# Patient Record
Sex: MALE | Race: ASIAN | NOT HISPANIC OR LATINO
[De-identification: names, ages, dates, MRNs, and addresses within clinical notes are randomized per-mention and may not be internally consistent; named-entity substitution may affect disease eponyms.]

---

## 2017-02-06 PROBLEM — Z00.00 ENCOUNTER FOR PREVENTIVE HEALTH EXAMINATION: Status: ACTIVE | Noted: 2017-02-06

## 2017-04-12 ENCOUNTER — APPOINTMENT (OUTPATIENT)
Dept: INTERNAL MEDICINE | Facility: CLINIC | Age: 32
End: 2017-04-12

## 2018-02-23 ENCOUNTER — OUTPATIENT (OUTPATIENT)
Dept: OUTPATIENT SERVICES | Facility: HOSPITAL | Age: 33
LOS: 1 days | End: 2018-02-23
Payer: COMMERCIAL

## 2018-02-23 ENCOUNTER — APPOINTMENT (OUTPATIENT)
Dept: MRI IMAGING | Facility: CLINIC | Age: 33
End: 2018-02-23
Payer: COMMERCIAL

## 2018-02-23 DIAGNOSIS — R42 DIZZINESS AND GIDDINESS: ICD-10-CM

## 2018-02-23 PROCEDURE — 82565 ASSAY OF CREATININE: CPT

## 2018-02-23 PROCEDURE — 70553 MRI BRAIN STEM W/O & W/DYE: CPT | Mod: 26

## 2018-02-23 PROCEDURE — 70553 MRI BRAIN STEM W/O & W/DYE: CPT

## 2018-02-23 PROCEDURE — A9585: CPT

## 2018-04-12 ENCOUNTER — APPOINTMENT (OUTPATIENT)
Dept: NEUROLOGY | Facility: CLINIC | Age: 33
End: 2018-04-12
Payer: COMMERCIAL

## 2018-04-12 VITALS — SYSTOLIC BLOOD PRESSURE: 140 MMHG | DIASTOLIC BLOOD PRESSURE: 80 MMHG

## 2018-04-12 VITALS
DIASTOLIC BLOOD PRESSURE: 86 MMHG | HEART RATE: 89 BPM | SYSTOLIC BLOOD PRESSURE: 129 MMHG | WEIGHT: 180 LBS | HEIGHT: 68 IN | BODY MASS INDEX: 27.28 KG/M2

## 2018-04-12 DIAGNOSIS — I10 ESSENTIAL (PRIMARY) HYPERTENSION: ICD-10-CM

## 2018-04-12 DIAGNOSIS — F17.200 NICOTINE DEPENDENCE, UNSPECIFIED, UNCOMPLICATED: ICD-10-CM

## 2018-04-12 DIAGNOSIS — Z78.9 OTHER SPECIFIED HEALTH STATUS: ICD-10-CM

## 2018-04-12 PROCEDURE — 99204 OFFICE O/P NEW MOD 45 MIN: CPT

## 2018-09-04 ENCOUNTER — APPOINTMENT (OUTPATIENT)
Dept: OPHTHALMOLOGY | Facility: CLINIC | Age: 33
End: 2018-09-04
Payer: COMMERCIAL

## 2018-09-04 ENCOUNTER — TRANSCRIPTION ENCOUNTER (OUTPATIENT)
Age: 33
End: 2018-09-04

## 2018-09-04 DIAGNOSIS — H10.12 ACUTE ATOPIC CONJUNCTIVITIS, LEFT EYE: ICD-10-CM

## 2018-09-04 PROCEDURE — 92004 COMPRE OPH EXAM NEW PT 1/>: CPT

## 2018-09-25 ENCOUNTER — APPOINTMENT (OUTPATIENT)
Dept: OPHTHALMOLOGY | Facility: CLINIC | Age: 33
End: 2018-09-25
Payer: COMMERCIAL

## 2018-09-25 PROCEDURE — 92012 INTRM OPH EXAM EST PATIENT: CPT

## 2018-09-25 PROCEDURE — 92250 FUNDUS PHOTOGRAPHY W/I&R: CPT

## 2018-10-25 ENCOUNTER — APPOINTMENT (OUTPATIENT)
Dept: OPHTHALMOLOGY | Facility: CLINIC | Age: 33
End: 2018-10-25
Payer: COMMERCIAL

## 2018-10-25 PROCEDURE — 76512 OPH US DX B-SCAN: CPT

## 2018-10-25 PROCEDURE — 76514 ECHO EXAM OF EYE THICKNESS: CPT

## 2018-10-25 PROCEDURE — 92012 INTRM OPH EXAM EST PATIENT: CPT

## 2018-10-25 PROCEDURE — 92133 CPTRZD OPH DX IMG PST SGM ON: CPT

## 2018-12-13 ENCOUNTER — APPOINTMENT (OUTPATIENT)
Dept: OPHTHALMOLOGY | Facility: CLINIC | Age: 33
End: 2018-12-13
Payer: COMMERCIAL

## 2018-12-13 PROCEDURE — 92083 EXTENDED VISUAL FIELD XM: CPT

## 2018-12-13 PROCEDURE — ZZZZZ: CPT

## 2018-12-13 PROCEDURE — 92012 INTRM OPH EXAM EST PATIENT: CPT

## 2019-04-06 ENCOUNTER — APPOINTMENT (OUTPATIENT)
Dept: RADIOLOGY | Facility: CLINIC | Age: 34
End: 2019-04-06
Payer: COMMERCIAL

## 2019-04-06 ENCOUNTER — OUTPATIENT (OUTPATIENT)
Dept: OUTPATIENT SERVICES | Facility: HOSPITAL | Age: 34
LOS: 1 days | End: 2019-04-06
Payer: COMMERCIAL

## 2019-04-06 DIAGNOSIS — Z00.8 ENCOUNTER FOR OTHER GENERAL EXAMINATION: ICD-10-CM

## 2019-04-06 PROCEDURE — 71046 X-RAY EXAM CHEST 2 VIEWS: CPT

## 2019-04-06 PROCEDURE — 71046 X-RAY EXAM CHEST 2 VIEWS: CPT | Mod: 26

## 2019-05-13 ENCOUNTER — APPOINTMENT (OUTPATIENT)
Dept: ELECTROPHYSIOLOGY | Facility: CLINIC | Age: 34
End: 2019-05-13

## 2019-09-19 DIAGNOSIS — Z31.440 ENCOUNTER OF MALE FOR TESTING FOR GENETIC DISEASE CARRIER STATUS FOR PROCREATIVE MANAGEMENT: ICD-10-CM

## 2019-10-01 LAB — GENE DIS ANL CARRIER INTERP-IMP: NEGATIVE

## 2019-12-05 ENCOUNTER — APPOINTMENT (OUTPATIENT)
Dept: OPHTHALMOLOGY | Facility: CLINIC | Age: 34
End: 2019-12-05
Payer: COMMERCIAL

## 2019-12-05 ENCOUNTER — NON-APPOINTMENT (OUTPATIENT)
Age: 34
End: 2019-12-05

## 2019-12-05 PROCEDURE — 92014 COMPRE OPH EXAM EST PT 1/>: CPT

## 2019-12-05 PROCEDURE — 92133 CPTRZD OPH DX IMG PST SGM ON: CPT

## 2020-01-07 ENCOUNTER — NON-APPOINTMENT (OUTPATIENT)
Age: 35
End: 2020-01-07

## 2020-01-07 ENCOUNTER — APPOINTMENT (OUTPATIENT)
Dept: OPHTHALMOLOGY | Facility: CLINIC | Age: 35
End: 2020-01-07
Payer: COMMERCIAL

## 2020-01-07 PROCEDURE — 92083 EXTENDED VISUAL FIELD XM: CPT

## 2020-01-07 PROCEDURE — 92012 INTRM OPH EXAM EST PATIENT: CPT

## 2020-03-17 ENCOUNTER — APPOINTMENT (OUTPATIENT)
Dept: OPHTHALMOLOGY | Facility: CLINIC | Age: 35
End: 2020-03-17

## 2020-05-12 ENCOUNTER — APPOINTMENT (OUTPATIENT)
Dept: OPHTHALMOLOGY | Facility: CLINIC | Age: 35
End: 2020-05-12

## 2022-06-13 ENCOUNTER — NON-APPOINTMENT (OUTPATIENT)
Age: 37
End: 2022-06-13

## 2022-06-13 ENCOUNTER — TRANSCRIPTION ENCOUNTER (OUTPATIENT)
Age: 37
End: 2022-06-13

## 2022-06-13 ENCOUNTER — APPOINTMENT (OUTPATIENT)
Dept: OPHTHALMOLOGY | Facility: CLINIC | Age: 37
End: 2022-06-13
Payer: COMMERCIAL

## 2022-06-13 PROCEDURE — 92083 EXTENDED VISUAL FIELD XM: CPT

## 2022-06-13 PROCEDURE — 92133 CPTRZD OPH DX IMG PST SGM ON: CPT

## 2022-06-13 PROCEDURE — 99214 OFFICE O/P EST MOD 30 MIN: CPT

## 2022-12-01 ENCOUNTER — APPOINTMENT (OUTPATIENT)
Dept: CARDIOLOGY | Facility: CLINIC | Age: 37
End: 2022-12-01

## 2022-12-01 ENCOUNTER — NON-APPOINTMENT (OUTPATIENT)
Age: 37
End: 2022-12-01

## 2022-12-01 VITALS
HEART RATE: 92 BPM | DIASTOLIC BLOOD PRESSURE: 110 MMHG | OXYGEN SATURATION: 100 % | BODY MASS INDEX: 26.67 KG/M2 | SYSTOLIC BLOOD PRESSURE: 180 MMHG | WEIGHT: 176 LBS | HEIGHT: 68 IN

## 2022-12-01 DIAGNOSIS — I10 ESSENTIAL (PRIMARY) HYPERTENSION: ICD-10-CM

## 2022-12-01 PROCEDURE — 99205 OFFICE O/P NEW HI 60 MIN: CPT

## 2022-12-01 PROCEDURE — 93000 ELECTROCARDIOGRAM COMPLETE: CPT

## 2022-12-01 RX ORDER — LOSARTAN POTASSIUM 100 MG/1
TABLET, FILM COATED ORAL
Refills: 0 | Status: DISCONTINUED | COMMUNITY
End: 2022-12-01

## 2022-12-01 NOTE — HISTORY OF PRESENT ILLNESS
[FreeTextEntry1] : 37 year old man with history of atrial fibrillation, HTN, pre-DM *A1c 5.9) coming in to establish care after following up elsewhere previously. States he has no compliants but just waned to reconnect with care. \par Stopped eating meat, lost 20 pounds.\par Cut down smoking- 1x/month. Before 3 cigarettes a day. \par Exercises everyday, has a a bike. \par \par Has no complaints, no chest pain, SOB. States he has white coat hypertension. \par BP 12/8014/90\par Reduced salt in cooking\par States he has been told he has irregular heart beat but nothing else. \par \par FH\par M and F- HTN\par No heart attack \par \par Medications\par Nifedipine ER 60 mg\par Fenofibrate 145 mcg - PCP stopping this because of the diet thing. PCP Dr. Eduardo\par \par LDL 80, TAG 50, HDL 51, Cholesterol 141

## 2022-12-01 NOTE — DISCUSSION/SUMMARY
[FreeTextEntry1] : 37 year old man with HTN, HLD, atrial fibrillation who presents today for cardiac care. \par \par HTN\par Extremely uncontrolled with repeat in office at 180/140. Respite reluctant to medications being added on, explained to patient he is at high risk for stroke, and especially so with his uncontrolled BP. as well as atrial fibrillation\par -continue nifedipine 60 mg daily \par -losartan 25 mg started today\par \par HLD\par Unclear why on fenofibrate solo therapy. Possibly was told he had high TAG in the past.TAG 50, LDL 80\par -Stop fenofibrate, recheck lipids in 3 months. If elevated TAG or LDL, plan for statin given first line\par \par Atrial fibrillation\par LIkely longer standing as patient knew of some history of it but no cardioversion or any rhythem control strategy in the past. \par -plan for TTE to define anatomy, LA size, durability of DCCV\par -patient to be set up with Dr. Stuart for DCCV as first line to see if sinus rhythem can be restored.\par -Chadsvasc 1 but reluctant to start AC in the setting of such elevated BP. Once better controlled, will start DOAC especially prior to possible DCCV\par \par Differential diagnosis and plan of care discussed with patient after the evaluation. \par Counseling on diet, exercise, and medication compliance was done.\par Counseling on smoking and alcohol cessation was offered when appropriate.\par Pain assessed and judicious use of narcotics when appropriate was discussed.\par Importance of fall prevention discussed.\par Advanced care planning was discussed with patient and family.\par   [EKG obtained to assist in diagnosis and management of assessed problem(s)] : EKG obtained to assist in diagnosis and management of assessed problem(s)

## 2022-12-06 ENCOUNTER — APPOINTMENT (OUTPATIENT)
Dept: ELECTROPHYSIOLOGY | Facility: CLINIC | Age: 37
End: 2022-12-06

## 2022-12-06 ENCOUNTER — NON-APPOINTMENT (OUTPATIENT)
Age: 37
End: 2022-12-06

## 2022-12-06 VITALS — SYSTOLIC BLOOD PRESSURE: 137 MMHG | DIASTOLIC BLOOD PRESSURE: 93 MMHG

## 2022-12-06 VITALS — SYSTOLIC BLOOD PRESSURE: 171 MMHG | OXYGEN SATURATION: 99 % | HEART RATE: 82 BPM | DIASTOLIC BLOOD PRESSURE: 101 MMHG

## 2022-12-06 DIAGNOSIS — R42 DIZZINESS AND GIDDINESS: ICD-10-CM

## 2022-12-06 DIAGNOSIS — I49.9 CARDIAC ARRHYTHMIA, UNSPECIFIED: ICD-10-CM

## 2022-12-06 PROCEDURE — 99204 OFFICE O/P NEW MOD 45 MIN: CPT

## 2022-12-06 PROCEDURE — 93000 ELECTROCARDIOGRAM COMPLETE: CPT

## 2022-12-06 RX ORDER — UBIDECARENONE/VIT E ACET 100MG-5
CAPSULE ORAL
Refills: 0 | Status: DISCONTINUED | COMMUNITY
End: 2022-12-06

## 2022-12-06 RX ORDER — FEBUXOSTAT 80 MG/1
80 TABLET ORAL
Refills: 0 | Status: DISCONTINUED | COMMUNITY
End: 2022-12-06

## 2022-12-06 RX ORDER — METOPROLOL TARTRATE 25 MG/1
25 TABLET, FILM COATED ORAL DAILY
Refills: 0 | Status: DISCONTINUED | COMMUNITY
End: 2022-12-06

## 2022-12-06 RX ORDER — LOTEPREDNOL ETABONATE AND TOBRAMYCIN 5; 3 MG/ML; MG/ML
0.5-0.3 SUSPENSION/ DROPS OPHTHALMIC
Qty: 1 | Refills: 0 | Status: DISCONTINUED | COMMUNITY
Start: 2018-09-04 | End: 2022-12-06

## 2022-12-06 RX ORDER — AMLODIPINE BESYLATE 2.5 MG/1
2.5 TABLET ORAL DAILY
Refills: 0 | Status: DISCONTINUED | COMMUNITY
End: 2022-12-06

## 2022-12-06 RX ORDER — FENOFIBRATE 145 MG/1
145 TABLET, COATED ORAL DAILY
Refills: 0 | Status: DISCONTINUED | COMMUNITY
End: 2022-12-06

## 2022-12-12 PROBLEM — R42 LIGHTHEADEDNESS: Status: ACTIVE | Noted: 2018-04-12

## 2022-12-12 PROBLEM — I49.9 CARDIAC ARRHYTHMIA: Status: ACTIVE | Noted: 2018-04-12

## 2022-12-12 NOTE — END OF VISIT
[FreeTextEntry3] : Seen and examined with ACP.  I agree with H and P, A and P.\par Frequent atrial ectopy and periods of PAT with Wenckebach.  MAY benefit from ablation, however I suggested that we try to assess with a repeat monitor.  [Time Spent: ___ minutes] : I have spent [unfilled] minutes of time on the encounter.

## 2022-12-12 NOTE — DISCUSSION/SUMMARY
[FreeTextEntry1] : Mr. Goldberg is a 37 year old male with long standing history of dizziness. His EKG today revealed Atrial rhythm with AV wenckebach, which is likely the cause of his dizziness. His echocardiogram revealed normal heart function. His previous monitoring revealed frequent APCs, however, unable to assess the burden. We discussed the medical therapy is limited as it may further worsen his symptoms. I suggested getting 3 days event monitoring (Zio) to assess APC burden and the rhythm pertaining to his symptoms.  We will revisit the option for therapy pending results.

## 2022-12-12 NOTE — HISTORY OF PRESENT ILLNESS
[FreeTextEntry1] : Mr. Goldberg is a 37 year old male presenting today for initial consultation for his dizziness. His past medical history includes hypertension, hyperlipidemia and dizziness. He was followed up by Dr. Prather for several years but recently established care with Dr. Mcnulty. He was noted with uncontrolled hypertension and was recently started on losartan. He also has a history of frequent APCs and recent EKG was suspicious for Afib and presents today for further evaluation. \par \par The patient reports that he has had dizziness for many years and in 2018 had seen a neurologist, however brain MRI didn’t reveal anything significant. He has had echo with Dr. Prather, which was unremarkable. He also wore an event monitor that revealed frequent APCs, c/w his history but no significant findings. He is anticipating carotid Doppler with his PCP soon. \par \par The patient report that his he gets episodes of dizziness every once a week, usually no triggers and occasionally experiences transient presyncopal feeling. The episodes have occurred mostly while driving but recently he had the episode while walking. No chest pain, SOB, 10 years of HTN. Initially his dizziness was presumed to be related to many hypertensive medications that he was taking and he has been taken off of most of the medications. He has felt a little better but the dizziness still occurs intermittently. He has made some lifestyle modifications (cut back on meat and carb intake) as well and has lost some weight since June, 2022. He hydrates adequately, smokes socially, drinks socially and minimal caffeine intake. Denies any drugs. No chest pain, SOB, palpitations or syncope.

## 2022-12-12 NOTE — PHYSICAL EXAM
[Well Developed] : well developed [Well Nourished] : well nourished [No Acute Distress] : no acute distress [Normal Conjunctiva] : normal conjunctiva [Normal Venous Pressure] : normal venous pressure [No Carotid Bruit] : no carotid bruit [Normal S1, S2] : normal S1, S2 [No Murmur] : no murmur [No Rub] : no rub [No Gallop] : no gallop [Clear Lung Fields] : clear lung fields [Good Air Entry] : good air entry [No Respiratory Distress] : no respiratory distress  [Soft] : abdomen soft [Non Tender] : non-tender [No Masses/organomegaly] : no masses/organomegaly [Normal Bowel Sounds] : normal bowel sounds [Normal Gait] : normal gait [No Edema] : no edema [No Cyanosis] : no cyanosis [No Clubbing] : no clubbing [No Varicosities] : no varicosities [No Rash] : no rash [No Skin Lesions] : no skin lesions [Moves all extremities] : moves all extremities [No Focal Deficits] : no focal deficits [Normal Speech] : normal speech [Alert and Oriented] : alert and oriented [Normal memory] : normal memory [de-identified] : EKG - atrial rhythm with AV Wenkebach

## 2022-12-12 NOTE — CARDIOLOGY SUMMARY
[de-identified] : Today - Atrial rhythm with AV Wenkebach [de-identified] : 04/13/2021:\par Normal LV size and systolic function with EF - 74%\par Trace aortic regurgitation\par Normal LA

## 2022-12-12 NOTE — REVIEW OF SYSTEMS
[Dizziness] : dizziness [Negative] : Heme/Lymph [FreeTextEntry5] : see HPI [de-identified] : see HPI

## 2023-01-13 ENCOUNTER — NON-APPOINTMENT (OUTPATIENT)
Age: 38
End: 2023-01-13

## 2023-01-13 ENCOUNTER — APPOINTMENT (OUTPATIENT)
Dept: CARDIOLOGY | Facility: CLINIC | Age: 38
End: 2023-01-13
Payer: COMMERCIAL

## 2023-01-13 VITALS
SYSTOLIC BLOOD PRESSURE: 164 MMHG | BODY MASS INDEX: 26.46 KG/M2 | WEIGHT: 174 LBS | OXYGEN SATURATION: 98 % | HEART RATE: 82 BPM | DIASTOLIC BLOOD PRESSURE: 71 MMHG

## 2023-01-13 PROCEDURE — 93000 ELECTROCARDIOGRAM COMPLETE: CPT

## 2023-01-13 PROCEDURE — 99215 OFFICE O/P EST HI 40 MIN: CPT

## 2023-01-13 NOTE — DISCUSSION/SUMMARY
[FreeTextEntry1] : 37 year old man with HTN, HLD, atrial fibrillation who presents today for cardiac care. \par \par HTN\par home BP reviewed, with normal pressues, with white coat HTN. Respite reluctant to medications being added on, explained to patient he is at high risk for stroke, and especially so with his uncontrolled BP. as well as atrial fibrillation\par -continue nifedipine 60 mg daily \par -losartan 25 mg started today\par \par HLD\par Unclear why on fenofibrate solo therapy. Possibly was told he had high TAG in the past.TAG 50, LDL 80\par -Stop fenofibrate, recheck lipids in 3 months. If elevated TAG or LDL, plan for statin given first line\par \par AV Wenchebach\par PAC burden 25% with AV Wenckebach. Symptomatic. \par -plan for TTE to define anatomy, LA size, durability of DCCV, read pending with normal LV LA size on my read\par -possible plan for atrial ablation\par \par Differential diagnosis and plan of care discussed with patient after the evaluation. \par Counseling on diet, exercise, and medication compliance was done.\par Counseling on smoking and alcohol cessation was offered when appropriate.\par Pain assessed and judicious use of narcotics when appropriate was discussed.\par Importance of fall prevention discussed.\par Advanced care planning was discussed with patient and family.\par

## 2023-01-13 NOTE — HISTORY OF PRESENT ILLNESS
[FreeTextEntry1] : 37 year old man with history of AV wenchebach, high PAC burden, HTN, pre-DM *A1c 5.9) coming in to establish care after following up elsewhere previously. Last time saw atrial arrythmia which he saw EP for and it is AV wenchebach with 25% PAC. States he has no complaints but just waned to reconnect with care. \par Stopped eating meat, lost 20 pounds.\par Cut down smoking- 1x/month. Before 3 cigarettes a day. Now completely 0. \par Exercises everyday, has a a bike. \par \par Has no complaints, no chest pain, SOB. States he has white coat hypertension- reviewed all his BP at home which are in normal range. \par BP 12/8014/90\par Reduced salt in cooking\par States he has been told he has irregular heart beat but nothing else. \par \par FH\par M and F- HTN\par No heart attack \par \par Medications\par Nifedipine ER 60 mg\par Fenofibrate 145 mcg - PCP stopping this because of the diet thing. PCP Dr. Eduardo\par \par LDL 80, TAG 50, HDL 51, Cholesterol 141

## 2023-02-09 ENCOUNTER — OUTPATIENT (OUTPATIENT)
Dept: OUTPATIENT SERVICES | Facility: HOSPITAL | Age: 38
LOS: 1 days | End: 2023-02-09
Payer: COMMERCIAL

## 2023-02-09 VITALS
TEMPERATURE: 98 F | DIASTOLIC BLOOD PRESSURE: 89 MMHG | HEIGHT: 68 IN | SYSTOLIC BLOOD PRESSURE: 158 MMHG | HEART RATE: 81 BPM | WEIGHT: 175.93 LBS | RESPIRATION RATE: 16 BRPM | OXYGEN SATURATION: 98 %

## 2023-02-09 DIAGNOSIS — I49.1 ATRIAL PREMATURE DEPOLARIZATION: ICD-10-CM

## 2023-02-09 DIAGNOSIS — Z01.818 ENCOUNTER FOR OTHER PREPROCEDURAL EXAMINATION: ICD-10-CM

## 2023-02-09 LAB
ANION GAP SERPL CALC-SCNC: 10 MMOL/L — SIGNIFICANT CHANGE UP (ref 5–17)
BLD GP AB SCN SERPL QL: NEGATIVE — SIGNIFICANT CHANGE UP
BUN SERPL-MCNC: 22 MG/DL — SIGNIFICANT CHANGE UP (ref 7–23)
CALCIUM SERPL-MCNC: 9.9 MG/DL — SIGNIFICANT CHANGE UP (ref 8.4–10.5)
CHLORIDE SERPL-SCNC: 104 MMOL/L — SIGNIFICANT CHANGE UP (ref 96–108)
CO2 SERPL-SCNC: 29 MMOL/L — SIGNIFICANT CHANGE UP (ref 22–31)
CREAT SERPL-MCNC: 0.76 MG/DL — SIGNIFICANT CHANGE UP (ref 0.5–1.3)
EGFR: 119 ML/MIN/1.73M2 — SIGNIFICANT CHANGE UP
GLUCOSE SERPL-MCNC: 88 MG/DL — SIGNIFICANT CHANGE UP (ref 70–99)
HCT VFR BLD CALC: 45.1 % — SIGNIFICANT CHANGE UP (ref 39–50)
HGB BLD-MCNC: 14.9 G/DL — SIGNIFICANT CHANGE UP (ref 13–17)
MCHC RBC-ENTMCNC: 29 PG — SIGNIFICANT CHANGE UP (ref 27–34)
MCHC RBC-ENTMCNC: 33 GM/DL — SIGNIFICANT CHANGE UP (ref 32–36)
MCV RBC AUTO: 87.7 FL — SIGNIFICANT CHANGE UP (ref 80–100)
NRBC # BLD: 0 /100 WBCS — SIGNIFICANT CHANGE UP (ref 0–0)
PLATELET # BLD AUTO: 243 K/UL — SIGNIFICANT CHANGE UP (ref 150–400)
POTASSIUM SERPL-MCNC: 4.2 MMOL/L — SIGNIFICANT CHANGE UP (ref 3.5–5.3)
POTASSIUM SERPL-SCNC: 4.2 MMOL/L — SIGNIFICANT CHANGE UP (ref 3.5–5.3)
RBC # BLD: 5.14 M/UL — SIGNIFICANT CHANGE UP (ref 4.2–5.8)
RBC # FLD: 12.9 % — SIGNIFICANT CHANGE UP (ref 10.3–14.5)
RH IG SCN BLD-IMP: POSITIVE — SIGNIFICANT CHANGE UP
SODIUM SERPL-SCNC: 143 MMOL/L — SIGNIFICANT CHANGE UP (ref 135–145)
WBC # BLD: 9.68 K/UL — SIGNIFICANT CHANGE UP (ref 3.8–10.5)
WBC # FLD AUTO: 9.68 K/UL — SIGNIFICANT CHANGE UP (ref 3.8–10.5)

## 2023-02-09 PROCEDURE — G0463: CPT

## 2023-02-09 PROCEDURE — 85027 COMPLETE CBC AUTOMATED: CPT

## 2023-02-09 PROCEDURE — 80048 BASIC METABOLIC PNL TOTAL CA: CPT

## 2023-02-09 PROCEDURE — 86850 RBC ANTIBODY SCREEN: CPT

## 2023-02-09 PROCEDURE — 86900 BLOOD TYPING SEROLOGIC ABO: CPT

## 2023-02-09 PROCEDURE — 86901 BLOOD TYPING SEROLOGIC RH(D): CPT

## 2023-02-09 RX ORDER — LOSARTAN POTASSIUM 100 MG/1
0 TABLET, FILM COATED ORAL
Qty: 0 | Refills: 0 | DISCHARGE

## 2023-02-09 RX ORDER — NIFEDIPINE 30 MG
0 TABLET, EXTENDED RELEASE 24 HR ORAL
Qty: 0 | Refills: 0 | DISCHARGE

## 2023-02-09 NOTE — H&P PST ADULT - HISTORY OF PRESENT ILLNESS
37 year old male with PMH HTN, Covid 7/2022 mild, atrial premature depolarization possible atrial fibrillation planned for afib ablation on 3/6/2023    ***covid test 3/3 @ [facility]   denies any recent covid infection or exposure  37 year old male with PMH HTN, Covid 7/2022 mild, lightheadedness x 10 years/atrial premature depolarization possible atrial fibrillation planned for afib ablation on 3/6/2023    ***covid test 3/3 @ [facility]   denies any recent covid infection or exposure

## 2023-02-09 NOTE — H&P PST ADULT - NSICDXPASTMEDICALHX_GEN_ALL_CORE_FT
PAST MEDICAL HISTORY:  Atrial premature depolarization     H/O dizziness     Hypertension      PAST MEDICAL HISTORY:  2019 novel coronavirus disease (COVID-19)     Atrial premature depolarization     Dry eyes     H/O dizziness     Hypertension

## 2023-02-09 NOTE — H&P PST ADULT - PROBLEM SELECTOR PLAN 1
afib ablation  PST labs send  preprocedure instructions discussed  continue antihypertensive meds at night

## 2023-03-01 ENCOUNTER — NON-APPOINTMENT (OUTPATIENT)
Age: 38
End: 2023-03-01

## 2023-03-03 ENCOUNTER — OUTPATIENT (OUTPATIENT)
Dept: OUTPATIENT SERVICES | Facility: HOSPITAL | Age: 38
LOS: 1 days | End: 2023-03-03
Payer: COMMERCIAL

## 2023-03-03 DIAGNOSIS — Z11.52 ENCOUNTER FOR SCREENING FOR COVID-19: ICD-10-CM

## 2023-03-03 PROBLEM — I49.1 ATRIAL PREMATURE DEPOLARIZATION: Chronic | Status: ACTIVE | Noted: 2023-02-09

## 2023-03-03 PROBLEM — I10 ESSENTIAL (PRIMARY) HYPERTENSION: Chronic | Status: ACTIVE | Noted: 2023-02-09

## 2023-03-03 PROBLEM — H04.123 DRY EYE SYNDROME OF BILATERAL LACRIMAL GLANDS: Chronic | Status: ACTIVE | Noted: 2023-02-09

## 2023-03-03 PROBLEM — Z87.898 PERSONAL HISTORY OF OTHER SPECIFIED CONDITIONS: Chronic | Status: ACTIVE | Noted: 2023-02-09

## 2023-03-03 PROBLEM — U07.1 COVID-19: Chronic | Status: ACTIVE | Noted: 2023-02-09

## 2023-03-03 PROCEDURE — U0003: CPT

## 2023-03-03 PROCEDURE — C9803: CPT

## 2023-03-03 PROCEDURE — U0005: CPT

## 2023-03-04 LAB — SARS-COV-2 RNA SPEC QL NAA+PROBE: SIGNIFICANT CHANGE UP

## 2023-03-06 ENCOUNTER — INPATIENT (INPATIENT)
Facility: HOSPITAL | Age: 38
LOS: 0 days | Discharge: HOME | DRG: 274 | End: 2023-03-06
Attending: INTERNAL MEDICINE | Admitting: INTERNAL MEDICINE
Payer: COMMERCIAL

## 2023-03-06 ENCOUNTER — TRANSCRIPTION ENCOUNTER (OUTPATIENT)
Age: 38
End: 2023-03-06

## 2023-03-06 VITALS
WEIGHT: 171.96 LBS | HEIGHT: 68 IN | OXYGEN SATURATION: 98 % | DIASTOLIC BLOOD PRESSURE: 86 MMHG | HEART RATE: 77 BPM | RESPIRATION RATE: 98 BRPM | TEMPERATURE: 99 F | SYSTOLIC BLOOD PRESSURE: 137 MMHG

## 2023-03-06 VITALS
RESPIRATION RATE: 17 BRPM | SYSTOLIC BLOOD PRESSURE: 134 MMHG | TEMPERATURE: 99 F | DIASTOLIC BLOOD PRESSURE: 95 MMHG | OXYGEN SATURATION: 98 % | HEART RATE: 65 BPM

## 2023-03-06 DIAGNOSIS — I49.1 ATRIAL PREMATURE DEPOLARIZATION: ICD-10-CM

## 2023-03-06 LAB
BLD GP AB SCN SERPL QL: NEGATIVE — SIGNIFICANT CHANGE UP
RH IG SCN BLD-IMP: POSITIVE — SIGNIFICANT CHANGE UP

## 2023-03-06 PROCEDURE — 93010 ELECTROCARDIOGRAM REPORT: CPT | Mod: 77

## 2023-03-06 PROCEDURE — 93653 COMPRE EP EVAL TX SVT: CPT

## 2023-03-06 PROCEDURE — 93623 PRGRMD STIMJ&PACG IV RX NFS: CPT | Mod: 26

## 2023-03-06 PROCEDURE — 86850 RBC ANTIBODY SCREEN: CPT

## 2023-03-06 PROCEDURE — 93010 ELECTROCARDIOGRAM REPORT: CPT

## 2023-03-06 PROCEDURE — 93005 ELECTROCARDIOGRAM TRACING: CPT

## 2023-03-06 PROCEDURE — 86901 BLOOD TYPING SEROLOGIC RH(D): CPT

## 2023-03-06 PROCEDURE — 86900 BLOOD TYPING SEROLOGIC ABO: CPT

## 2023-03-06 PROCEDURE — 93623 PRGRMD STIMJ&PACG IV RX NFS: CPT

## 2023-03-06 NOTE — ASU DISCHARGE PLAN (ADULT/PEDIATRIC) - CARE PROVIDER_API CALL
John Carter)  Cardiac Electrophysiology; Cardiology  [address] [place] [address]  Phone: [phone]  Fax: [phone]  Scheduled Appointment: 04/18/2023 02:30 PM

## 2023-03-06 NOTE — CHART NOTE - NSCHARTNOTEFT_GEN_A_CORE
PRE ABLATION NOTE    H and P dated 2/9/23  Medications reconciled  Allergies reviewed  PE unchanged.    Pt presents for afib ablation.     Micah Choudhary, NP

## 2023-03-06 NOTE — ASU DISCHARGE PLAN (ADULT/PEDIATRIC) - HAVE YOU HAD COVID IN THE LAST 60 DAYS?
y  Post -op Follow-up / Discharge Note / Instructions        Analgesics:      Tylenol #3 / Motrin  Antibiotics:     na  Dressings:      Nbandaids   Diet:      Regular                 Follow-up:      2 weeks     Discharge Instructions / Activity:      Pelvic rest   No

## 2023-03-06 NOTE — PATIENT PROFILE ADULT - FALL HARM RISK - UNIVERSAL INTERVENTIONS
Bed in lowest position, wheels locked, appropriate side rails in place/Call bell, personal items and telephone in reach/Instruct patient to call for assistance before getting out of bed or chair/Non-slip footwear when patient is out of bed/[place] to call system/Physically safe environment - no spills, clutter or unnecessary equipment/Purposeful Proactive Rounding/Room/bathroom lighting operational, light cord in reach

## 2023-03-17 ENCOUNTER — NON-APPOINTMENT (OUTPATIENT)
Age: 38
End: 2023-03-17

## 2023-04-11 ENCOUNTER — NON-APPOINTMENT (OUTPATIENT)
Age: 38
End: 2023-04-11

## 2023-04-11 ENCOUNTER — APPOINTMENT (OUTPATIENT)
Dept: OTOLARYNGOLOGY | Facility: CLINIC | Age: 38
End: 2023-04-11
Payer: COMMERCIAL

## 2023-04-11 VITALS
SYSTOLIC BLOOD PRESSURE: 156 MMHG | TEMPERATURE: 97.9 F | BODY MASS INDEX: 26.52 KG/M2 | WEIGHT: 175 LBS | HEIGHT: 68 IN | HEART RATE: 86 BPM | DIASTOLIC BLOOD PRESSURE: 97 MMHG

## 2023-04-11 DIAGNOSIS — R26.89 OTHER ABNORMALITIES OF GAIT AND MOBILITY: ICD-10-CM

## 2023-04-11 DIAGNOSIS — J34.2 DEVIATED NASAL SEPTUM: ICD-10-CM

## 2023-04-11 DIAGNOSIS — H93.13 TINNITUS, BILATERAL: ICD-10-CM

## 2023-04-11 PROCEDURE — 92567 TYMPANOMETRY: CPT

## 2023-04-11 PROCEDURE — 31231 NASAL ENDOSCOPY DX: CPT

## 2023-04-11 PROCEDURE — 92557 COMPREHENSIVE HEARING TEST: CPT

## 2023-04-11 PROCEDURE — 99204 OFFICE O/P NEW MOD 45 MIN: CPT | Mod: 25

## 2023-04-11 NOTE — HISTORY OF PRESENT ILLNESS
[de-identified] : Patient has had about 5 years of bilateral ringing in the ears. He states that its daily, but does not keep him awake at night \par He then had an ablation last month and the ringing is worse after the anesthesia for that. \par He has not had any recent hearing tests \par he occasionally feels unbalanced since the ringing worsened.

## 2023-04-11 NOTE — ASSESSMENT
[FreeTextEntry1] : Patient long history of tinnitus has not been worked up in the past recently worsened after he had an a anesthesia for an ablation for arrhythmias on examination is ears are perfectly normal endoscopically has deviated septum to the left.  Audiogram was performed he will follow-up with us if there is any further changes in his tinnitus knowing that there is no treatment at this time.

## 2023-04-11 NOTE — CONSULT LETTER
[Dear  ___] : Dear  [unfilled], [Consult Letter:] : I had the pleasure of evaluating your patient, [unfilled]. [Please see my note below.] : Please see my note below. [Consult Closing:] : Thank you very much for allowing me to participate in the care of this patient.  If you have any questions, please do not hesitate to contact me. [Sincerely,] : Sincerely, [FreeTextEntry3] : Sinan Gongora MD\par [facility]\par Otolaryngology and Facial Plastics\par Associated Professor, [employer]\par

## 2023-04-18 ENCOUNTER — NON-APPOINTMENT (OUTPATIENT)
Age: 38
End: 2023-04-18

## 2023-04-18 ENCOUNTER — APPOINTMENT (OUTPATIENT)
Dept: ELECTROPHYSIOLOGY | Facility: CLINIC | Age: 38
End: 2023-04-18
Payer: COMMERCIAL

## 2023-04-18 VITALS
OXYGEN SATURATION: 99 % | WEIGHT: 175 LBS | SYSTOLIC BLOOD PRESSURE: 156 MMHG | HEIGHT: 68 IN | HEART RATE: 66 BPM | DIASTOLIC BLOOD PRESSURE: 102 MMHG | BODY MASS INDEX: 26.52 KG/M2

## 2023-04-18 DIAGNOSIS — I49.1 ATRIAL PREMATURE DEPOLARIZATION: ICD-10-CM

## 2023-04-18 PROCEDURE — 99213 OFFICE O/P EST LOW 20 MIN: CPT

## 2023-04-18 PROCEDURE — 93000 ELECTROCARDIOGRAM COMPLETE: CPT

## 2023-04-18 NOTE — HISTORY OF PRESENT ILLNESS
[FreeTextEntry1] : Mr. Goldberg is a 37 year old male with long standing history of dizziness. His EKG today revealed Atrial rhythm with AV wenckebach, which is a plausible explanation for his dizziness. on  3/6/2023 he underwent EP testing and ablation (Successful ablation of atrial tachycardia mapped to the base of the right atrial appendage, Dual AV ann marie physiology WITHOUT inducible AVNRT on/off isuprel).\par \par Feels much better.  No palpitations. No LH/dizziness.  Exercise tolerance is good.  Groin is well healed.

## 2023-04-18 NOTE — REVIEW OF SYSTEMS
[Dizziness] : dizziness [Negative] : Heme/Lymph [FreeTextEntry5] : see HPI [de-identified] : see HPI

## 2023-04-18 NOTE — DISCUSSION/SUMMARY
[FreeTextEntry1] : 3/6/2023 he underwent EP testing and ablation (Successful ablation of atrial tachycardia mapped to the base of the right atrial appendage, Dual AV ann marie physiology WITHOUT inducible AVNRT on/off isuprel). He is doing well post procedure.  His BP is very elevated and I suggested that he increase his lostartan to 50 mg daily (consider Hyzaar).  He will follow up with Dr Mcnulty. I will see as needed.

## 2023-04-18 NOTE — CARDIOLOGY SUMMARY
[de-identified] : Today - SR [de-identified] : 04/13/2021:\par Normal LV size and systolic function with EF - 74%\par Trace aortic regurgitation\par Normal LA [de-identified] : 3/6/2023\par Successful ablation of atrial tachycardia mapped to the base of the\par right atrial appendage\par Dual AV ann marie physiology WITHOUT inducible AVNRT on/off isuprel

## 2023-04-18 NOTE — PHYSICAL EXAM

## 2023-05-19 ENCOUNTER — APPOINTMENT (OUTPATIENT)
Dept: CARDIOLOGY | Facility: CLINIC | Age: 38
End: 2023-05-19
Payer: COMMERCIAL

## 2023-05-19 ENCOUNTER — NON-APPOINTMENT (OUTPATIENT)
Age: 38
End: 2023-05-19

## 2023-05-19 VITALS
BODY MASS INDEX: 26.52 KG/M2 | DIASTOLIC BLOOD PRESSURE: 90 MMHG | HEART RATE: 97 BPM | WEIGHT: 175 LBS | SYSTOLIC BLOOD PRESSURE: 144 MMHG | OXYGEN SATURATION: 100 % | HEIGHT: 68 IN

## 2023-05-19 DIAGNOSIS — E78.5 HYPERLIPIDEMIA, UNSPECIFIED: ICD-10-CM

## 2023-05-19 PROCEDURE — 99215 OFFICE O/P EST HI 40 MIN: CPT

## 2023-05-19 PROCEDURE — 93000 ELECTROCARDIOGRAM COMPLETE: CPT

## 2023-05-19 RX ORDER — EVOLOCUMAB 140 MG/ML
140 INJECTION, SOLUTION SUBCUTANEOUS
Qty: 30 | Refills: 2 | Status: DISCONTINUED | COMMUNITY
Start: 2023-05-19 | End: 2023-05-19

## 2023-05-19 NOTE — DISCUSSION/SUMMARY
[FreeTextEntry1] : 37 year old man with HTN, HLD, atrial fibrillation who presents today for cardiac care. \par \par HTN\par home BP reviewed, with normal pressures, with white coat HTN. Respite reluctant to medications being added on, explained to patient he is at high risk for stroke, and especially so with his uncontrolled BP. \par -continue nifedipine 60 mg daily, PM\par -continue losartan 50 mg (recently increased) with AM dosing. \par -patient to bring in blood pressure machine in order to check accuracy given normal presssures at home\par \par HLD\par Unclear why on fenofibrate solo therapy. Possibly was told he had high TAG in the past.TAG 50, LDL 80 now to 129.\par -fenofibrate stopped with mild elevation in LDL to 129. No DM2 or prior atherosclerotic disease. Will monitor and if continues to rise, plan for statin \par \par AV Wenchebach/AT\par Ablated and really no symptoms but now back in it. \par -will discuss with EPS\par \par Differential diagnosis and plan of care discussed with patient after the evaluation. \par Counseling on diet, exercise, and medication compliance was done.\par Counseling on smoking and alcohol cessation was offered when appropriate.\par Pain assessed and judicious use of narcotics when appropriate was discussed.\par Importance of fall prevention discussed.\par Advanced care planning was discussed with patient and family.\par

## 2023-05-19 NOTE — HISTORY OF PRESENT ILLNESS
[FreeTextEntry1] : 37 year old man with history of AV wenchebach, high PAC burden, HTN, pre-DM *A1c 5.9) coming in for follow up.\par On 3/6/2023 he underwent EP testing and ablation (Successful ablation of atrial tachycardia mapped to the base of the right atrial appendage, Dual AV ann marie physiology WITHOUT inducible AVNRT on/off isuprel).\par \par Stopped eating meat, lost 20 pounds.\par Cut down smoking- 1x/month. Before 3 cigarettes a day. Now completely 0. \par Exercises everyday, has a a bike. \par \par Has no complaints, no chest pain, SOB. States he has white coat hypertension- reviewed all his BP at home which are in normal range however in office remains elevated. \par BP 12/8014/90\par Reduced salt in cooking\par States he has\par LDL 80--> 129\par A1c 5.9

## 2023-06-13 ENCOUNTER — NON-APPOINTMENT (OUTPATIENT)
Age: 38
End: 2023-06-13

## 2023-06-13 ENCOUNTER — APPOINTMENT (OUTPATIENT)
Dept: OPHTHALMOLOGY | Facility: CLINIC | Age: 38
End: 2023-06-13
Payer: COMMERCIAL

## 2023-06-13 PROCEDURE — 92083 EXTENDED VISUAL FIELD XM: CPT

## 2023-06-13 PROCEDURE — 99214 OFFICE O/P EST MOD 30 MIN: CPT

## 2023-06-13 PROCEDURE — 92133 CPTRZD OPH DX IMG PST SGM ON: CPT

## 2023-07-14 ENCOUNTER — APPOINTMENT (OUTPATIENT)
Dept: CARDIOLOGY | Facility: CLINIC | Age: 38
End: 2023-07-14
Payer: COMMERCIAL

## 2023-07-14 ENCOUNTER — NON-APPOINTMENT (OUTPATIENT)
Age: 38
End: 2023-07-14

## 2023-07-14 VITALS
OXYGEN SATURATION: 96 % | BODY MASS INDEX: 25.85 KG/M2 | DIASTOLIC BLOOD PRESSURE: 90 MMHG | SYSTOLIC BLOOD PRESSURE: 156 MMHG | HEART RATE: 77 BPM | WEIGHT: 170 LBS

## 2023-07-14 PROCEDURE — 99215 OFFICE O/P EST HI 40 MIN: CPT

## 2023-07-14 PROCEDURE — 93000 ELECTROCARDIOGRAM COMPLETE: CPT

## 2023-07-14 NOTE — DISCUSSION/SUMMARY
[FreeTextEntry1] : 37 year old man with HTN, HLD, atrial fibrillation who presents today for cardiac care. \par \par HTN\par home BP reviewed, with normal pressures, with white coat HTN. Respite reluctant to medications being added on, explained to patient he is at high risk for stroke, and especially so with his uncontrolled BP. Brought in machine which correlates with office but its controlled at home.  \par -continue nifedipine 60 mg daily, PM\par -continue losartan 50 mg (recently increased) with AM dosing. \par \par HLD\par Unclear why on fenofibrate solo therapy. Possibly was told he had high TAG in the past.TAG 50, LDL 80 now to 129.\par -fenofibrate stopped with mild elevation in LDL to 129. No DM2 or prior atherosclerotic disease. Will monitor and if continues to rise, plan for statin \par \par AV Wenchebach/AT\par Ablated and really no symptoms but now back in it. \par -will discuss with EPS\par \par Follow up in 6 months \par \par Differential diagnosis and plan of care discussed with patient after the evaluation. \par Counseling on diet, exercise, and medication compliance was done.\par Counseling on smoking and alcohol cessation was offered when appropriate.\par Pain assessed and judicious use of narcotics when appropriate was discussed.\par Importance of fall prevention discussed.\par Advanced care planning was discussed with patient and family.\par   [EKG obtained to assist in diagnosis and management of assessed problem(s)] : EKG obtained to assist in diagnosis and management of assessed problem(s)

## 2023-07-14 NOTE — HISTORY OF PRESENT ILLNESS
[FreeTextEntry1] : 37 year old man with history of AV wenchebach, high PAC burden, HTN, pre-DM *A1c 5.9) coming in for follow up.\par On 3/6/2023 he underwent EP testing and ablation (Successful ablation of atrial tachycardia mapped to the base of the right atrial appendage, Dual AV ann marie physiology WITHOUT inducible AVNRT on/off isuprel).\par \par Stopped eating meat, lost 20 pounds.\par Cut down smoking- 1x/month. Before 3 cigarettes a day. Now completely 0. \par Exercises everyday, has a a bike. \par \par Has no complaints, no chest pain, SOB. States he has white coat hypertension- reviewed all his BP at home which are in normal range however in office remains elevated. \par BP 12/8014/90. In office checks shows also high BP but normal. \par Reduced salt in cooking\par States he has\par LDL 80--> 129\par A1c 5.9

## 2023-11-01 ENCOUNTER — APPOINTMENT (OUTPATIENT)
Dept: OPHTHALMOLOGY | Facility: CLINIC | Age: 38
End: 2023-11-01

## 2023-12-08 ENCOUNTER — APPOINTMENT (OUTPATIENT)
Dept: CARDIOLOGY | Facility: CLINIC | Age: 38
End: 2023-12-08

## 2023-12-15 ENCOUNTER — APPOINTMENT (OUTPATIENT)
Dept: CARDIOLOGY | Facility: CLINIC | Age: 38
End: 2023-12-15
Payer: COMMERCIAL

## 2023-12-15 ENCOUNTER — NON-APPOINTMENT (OUTPATIENT)
Age: 38
End: 2023-12-15

## 2023-12-15 VITALS
BODY MASS INDEX: 25.09 KG/M2 | SYSTOLIC BLOOD PRESSURE: 160 MMHG | WEIGHT: 165 LBS | DIASTOLIC BLOOD PRESSURE: 80 MMHG | HEART RATE: 67 BPM | OXYGEN SATURATION: 98 %

## 2023-12-15 PROCEDURE — 93000 ELECTROCARDIOGRAM COMPLETE: CPT

## 2023-12-15 PROCEDURE — 99215 OFFICE O/P EST HI 40 MIN: CPT

## 2023-12-15 NOTE — HISTORY OF PRESENT ILLNESS
[FreeTextEntry1] : 38 year old man with history of AV wenchebach, high PAC burden, HTN, pre-DM *A1c 5.9), uric acid coming in for follow up. On 3/6/2023 he underwent EP testing and ablation (Successful ablation of atrial tachycardia mapped to the base of the right atrial appendage, Dual AV ann marie physiology WITHOUT inducible AVNRT on/off isuprel).  Stopped eating meat, lost 20 pounds. Cut down smoking- 1x/month. Before 3 cigarettes a day. Now completely 0.  Exercises everyday, has a a bike.   Has no complaints, no chest pain, SOB. States he has white coat hypertension- reviewed all his BP at home which are in normal range however in office remains elevated.  BP 12/8014/90. In office checks shows also high BP but normal.  Reduced salt in cooking States he has LDL 80--> 129--> 130 A1c 5.9--> 5.6

## 2023-12-15 NOTE — DISCUSSION/SUMMARY
[FreeTextEntry1] : 38 year old man with HTN, HLD, atrial fibrillation who presents today for cardiac care.   HTN home BP reviewed, with normal pressures, with white coat HTN. Respite reluctant to medications being added on, explained to patient he is at high risk for stroke, and especially so with his uncontrolled BP. Brought in machine which correlates with office but its controlled at home.   -continue nifedipine 60 mg daily, PM -continue losartan 50 mg (recently increased) with AM dosing.   HLD Unclear why on fenofibrate solo therapy. Possibly was told he had high TAG in the past.TAG 50, LDL 80 now to 129 and up trending.  -fenofibrate stopped with elevation -does have atypical chest pain so in the setting of HLD, and atypical symptoms, plan for CTA to risk stratify   AV Wenchebach/AT Ablated and really no symptoms but now back in it.  -will discuss with EPS  Follow up in 6 months   Differential diagnosis and plan of care discussed with patient after the evaluation.  Counseling on diet, exercise, and medication compliance was done. Counseling on smoking and alcohol cessation was offered when appropriate. Pain assessed and judicious use of narcotics when appropriate was discussed. Importance of fall prevention discussed. Advanced care planning was discussed with patient and family.   [EKG obtained to assist in diagnosis and management of assessed problem(s)] : EKG obtained to assist in diagnosis and management of assessed problem(s)

## 2024-01-11 NOTE — END OF VISIT
[FreeTextEntry3] : I, Dr. Gongora personally performed the evaluation and management (E/M) services including all necessary procedures, for this new patient. That E/M includes conducting the clinically appropriate initial history &/or exam, assessing all conditions, and establishing the plan of care. Today, my EUSEBIA, Tiffany Ren, was here to observe &/or participate in the visit & follow plan of care established by me.\par \par \par  Yes

## 2024-03-19 ENCOUNTER — APPOINTMENT (OUTPATIENT)
Dept: CT IMAGING | Facility: CLINIC | Age: 39
End: 2024-03-19
Payer: COMMERCIAL

## 2024-03-19 ENCOUNTER — OUTPATIENT (OUTPATIENT)
Dept: OUTPATIENT SERVICES | Facility: HOSPITAL | Age: 39
LOS: 1 days | End: 2024-03-19
Payer: COMMERCIAL

## 2024-03-19 ENCOUNTER — RESULT REVIEW (OUTPATIENT)
Age: 39
End: 2024-03-19

## 2024-03-19 DIAGNOSIS — I10 ESSENTIAL (PRIMARY) HYPERTENSION: ICD-10-CM

## 2024-03-19 DIAGNOSIS — E78.5 HYPERLIPIDEMIA, UNSPECIFIED: ICD-10-CM

## 2024-03-19 PROCEDURE — 75574 CT ANGIO HRT W/3D IMAGE: CPT

## 2024-03-19 PROCEDURE — 75574 CT ANGIO HRT W/3D IMAGE: CPT | Mod: 26

## 2024-06-14 ENCOUNTER — NON-APPOINTMENT (OUTPATIENT)
Age: 39
End: 2024-06-14

## 2024-06-14 ENCOUNTER — APPOINTMENT (OUTPATIENT)
Dept: CARDIOLOGY | Facility: CLINIC | Age: 39
End: 2024-06-14
Payer: COMMERCIAL

## 2024-06-14 VITALS
OXYGEN SATURATION: 100 % | BODY MASS INDEX: 25.7 KG/M2 | SYSTOLIC BLOOD PRESSURE: 163 MMHG | WEIGHT: 169 LBS | DIASTOLIC BLOOD PRESSURE: 95 MMHG | HEART RATE: 59 BPM

## 2024-06-14 PROCEDURE — 99215 OFFICE O/P EST HI 40 MIN: CPT

## 2024-06-14 PROCEDURE — 93000 ELECTROCARDIOGRAM COMPLETE: CPT

## 2024-06-14 RX ORDER — LOSARTAN POTASSIUM 50 MG/1
50 TABLET, FILM COATED ORAL DAILY
Qty: 90 | Refills: 2 | Status: ACTIVE | COMMUNITY
Start: 2022-12-01 | End: 1900-01-01

## 2024-06-14 RX ORDER — ATORVASTATIN CALCIUM 20 MG/1
20 TABLET, FILM COATED ORAL
Qty: 90 | Refills: 2 | Status: ACTIVE | COMMUNITY
Start: 2024-06-14 | End: 1900-01-01

## 2024-06-14 RX ORDER — ATORVASTATIN CALCIUM 20 MG/1
20 TABLET, FILM COATED ORAL
Qty: 30 | Refills: 6 | Status: DISCONTINUED | COMMUNITY
Start: 2024-06-14 | End: 2024-06-14

## 2024-06-14 RX ORDER — NIFEDIPINE 60 MG/1
60 TABLET, EXTENDED RELEASE ORAL DAILY
Qty: 90 | Refills: 2 | Status: ACTIVE | COMMUNITY
Start: 2022-12-01 | End: 1900-01-01

## 2024-06-14 NOTE — HISTORY OF PRESENT ILLNESS
[FreeTextEntry1] : 38 year old man with history of AV wenchebach, high PAC burden, HTN, pre-DM *A1c 5.9), uric acid coming in for follow up. On 3/6/2023 he underwent EP testing and ablation (Successful ablation of atrial tachycardia mapped to the base of the right atrial appendage, Dual AV ann marie physiology WITHOUT inducible AVNRT on/off isuprel).  Stopped eating meat, lost 20 pounds. Cut down smoking- 1x/month. Before 3 cigarettes a day. Now completely 0.  Exercises everyday, has a a bike.   Has no complaints, no chest pain, SOB. States he has white coat hypertension- reviewed all his BP at home which are in normal range however in office remains elevated.  BP 12/8014/90. In office checks shows also high BP but normal.  Reduced salt in cooking States he has LDL 80--> 129--> 130--> 138  A1c 5.9--> 5.6--> 5.8  CT done recently also showed mild plaque in the LAD, RCA. Ca score of 4.

## 2024-06-14 NOTE — DISCUSSION/SUMMARY
[FreeTextEntry1] : 38 year old man with HTN, HLD, atrial fibrillation who presents today for cardiac care.   HTN home BP reviewed, with normal pressures, with white coat HTN. Respite reluctant to medications being added on, explained to patient he is at high risk for stroke, and especially so with his uncontrolled BP. Brought in machine which correlates with office but its controlled at home.   -continue nifedipine 60 mg daily, PM -continue losartan 50 mg (recently increased) with AM dosing.   HLD Unclear why on fenofibrate solo therapy. Possibly was told he had high TAG in the past.TAG 50, LDL 80 now to 129 and up trending.  -fenofibrate stopped with elevation -given CTA early plaque formation, elevated LDL and rising, plan for low dose atorvastatin at 20 mg   Follow up in 6 months.   Differential diagnosis and plan of care discussed with patient after the evaluation.  Counseling on diet, exercise, and medication compliance was done. Counseling on smoking and alcohol cessation was offered when appropriate. Pain assessed and judicious use of narcotics when appropriate was discussed. Importance of fall prevention discussed. Advanced care planning was discussed with patient and family.  AV [place]/AT Ablated and really no symptoms but now back in it.  -will discuss with EPS  Follow up in 6 months   Differential diagnosis and plan of care discussed with patient after the evaluation.  Counseling on diet, exercise, and medication compliance was done. Counseling on smoking and alcohol cessation was offered when appropriate. Pain assessed and judicious use of narcotics when appropriate was discussed. Importance of fall prevention discussed. Advanced care planning was discussed with patient and family.   [EKG obtained to assist in diagnosis and management of assessed problem(s)] : EKG obtained to assist in diagnosis and management of assessed problem(s)

## 2024-06-18 ENCOUNTER — NON-APPOINTMENT (OUTPATIENT)
Age: 39
End: 2024-06-18

## 2024-10-01 ENCOUNTER — NON-APPOINTMENT (OUTPATIENT)
Age: 39
End: 2024-10-01

## 2024-10-01 ENCOUNTER — APPOINTMENT (OUTPATIENT)
Dept: OPHTHALMOLOGY | Facility: CLINIC | Age: 39
End: 2024-10-01
Payer: COMMERCIAL

## 2024-10-01 PROCEDURE — 92083 EXTENDED VISUAL FIELD XM: CPT

## 2024-10-01 PROCEDURE — 92133 CPTRZD OPH DX IMG PST SGM ON: CPT

## 2024-10-01 PROCEDURE — 99214 OFFICE O/P EST MOD 30 MIN: CPT

## 2024-12-13 ENCOUNTER — NON-APPOINTMENT (OUTPATIENT)
Age: 39
End: 2024-12-13

## 2024-12-13 ENCOUNTER — APPOINTMENT (OUTPATIENT)
Dept: CARDIOLOGY | Facility: CLINIC | Age: 39
End: 2024-12-13
Payer: COMMERCIAL

## 2024-12-13 VITALS
OXYGEN SATURATION: 99 % | SYSTOLIC BLOOD PRESSURE: 184 MMHG | HEART RATE: 64 BPM | WEIGHT: 180 LBS | DIASTOLIC BLOOD PRESSURE: 80 MMHG | BODY MASS INDEX: 27.37 KG/M2

## 2024-12-13 PROCEDURE — 93000 ELECTROCARDIOGRAM COMPLETE: CPT

## 2024-12-13 PROCEDURE — 99215 OFFICE O/P EST HI 40 MIN: CPT

## 2025-02-11 ENCOUNTER — RX RENEWAL (OUTPATIENT)
Age: 40
End: 2025-02-11

## 2025-03-17 NOTE — H&P PST ADULT - NEGATIVE OPHTHALMOLOGIC SYMPTOMS
Insurance limit of 1 capsule a day. New Rx sent for Strattera 18 mg - 1 tablet daily for 30 days.    1. Attention deficit hyperactivity disorder (ADHD), combined type  -     atomoxetine (STRATTERA) 18 MG capsule; Take 1 capsule by mouth daily, Disp-30 capsule, R-0Normal      no diplopia/no pain L/no pain R

## 2025-04-07 ENCOUNTER — RX RENEWAL (OUTPATIENT)
Age: 40
End: 2025-04-07

## 2025-06-11 ENCOUNTER — RX RENEWAL (OUTPATIENT)
Age: 40
End: 2025-06-11

## 2025-06-11 ENCOUNTER — NON-APPOINTMENT (OUTPATIENT)
Age: 40
End: 2025-06-11

## 2025-06-13 ENCOUNTER — APPOINTMENT (OUTPATIENT)
Dept: CARDIOLOGY | Facility: CLINIC | Age: 40
End: 2025-06-13

## 2025-09-15 ENCOUNTER — APPOINTMENT (OUTPATIENT)
Dept: CARDIOLOGY | Facility: CLINIC | Age: 40
End: 2025-09-15
Payer: COMMERCIAL

## 2025-09-15 VITALS
BODY MASS INDEX: 27.67 KG/M2 | DIASTOLIC BLOOD PRESSURE: 80 MMHG | WEIGHT: 182 LBS | OXYGEN SATURATION: 99 % | SYSTOLIC BLOOD PRESSURE: 142 MMHG | HEART RATE: 75 BPM

## 2025-09-15 DIAGNOSIS — Z86.79 PERSONAL HISTORY OF OTHER DISEASES OF THE CIRCULATORY SYSTEM: ICD-10-CM

## 2025-09-15 DIAGNOSIS — H93.13 TINNITUS, BILATERAL: ICD-10-CM

## 2025-09-15 DIAGNOSIS — R42 DIZZINESS AND GIDDINESS: ICD-10-CM

## 2025-09-15 DIAGNOSIS — E78.5 HYPERLIPIDEMIA, UNSPECIFIED: ICD-10-CM

## 2025-09-15 DIAGNOSIS — I49.1 ATRIAL PREMATURE DEPOLARIZATION: ICD-10-CM

## 2025-09-15 DIAGNOSIS — Z87.09 PERSONAL HISTORY OF OTHER DISEASES OF THE RESPIRATORY SYSTEM: ICD-10-CM

## 2025-09-15 DIAGNOSIS — I25.10 ATHEROSCLEROTIC HEART DISEASE OF NATIVE CORONARY ARTERY W/OUT ANGINA PECTORIS: ICD-10-CM

## 2025-09-15 DIAGNOSIS — I10 ESSENTIAL (PRIMARY) HYPERTENSION: ICD-10-CM

## 2025-09-15 DIAGNOSIS — M10.9 GOUT, UNSPECIFIED: ICD-10-CM

## 2025-09-15 DIAGNOSIS — Z87.898 PERSONAL HISTORY OF OTHER SPECIFIED CONDITIONS: ICD-10-CM

## 2025-09-15 DIAGNOSIS — Z31.440 ENCOUNTER OF MALE FOR TESTING FOR GENETIC DISEASE CARRIER STATUS FOR PROCREATIVE MANAGEMENT: ICD-10-CM

## 2025-09-15 DIAGNOSIS — H10.12 ACUTE ATOPIC CONJUNCTIVITIS, LEFT EYE: ICD-10-CM

## 2025-09-15 PROCEDURE — 99205 OFFICE O/P NEW HI 60 MIN: CPT

## 2025-09-15 PROCEDURE — 93000 ELECTROCARDIOGRAM COMPLETE: CPT

## 2025-09-15 PROCEDURE — G2211 COMPLEX E/M VISIT ADD ON: CPT

## 2025-09-15 RX ORDER — ALLOPURINOL 100 MG/1
100 TABLET ORAL
Refills: 0 | Status: ACTIVE | COMMUNITY

## 2025-09-16 LAB
25(OH)D3 SERPL-MCNC: 32.6 NG/ML
ALBUMIN SERPL ELPH-MCNC: 5.2 G/DL
ALP BLD-CCNC: 50 U/L
ALT SERPL-CCNC: 36 U/L
ANION GAP SERPL CALC-SCNC: 14 MMOL/L
APO B SERPL-MCNC: 59 MG/DL
AST SERPL-CCNC: 23 U/L
BILIRUB SERPL-MCNC: 0.4 MG/DL
BUN SERPL-MCNC: 15 MG/DL
CALCIUM SERPL-MCNC: 9.9 MG/DL
CHLORIDE SERPL-SCNC: 105 MMOL/L
CHOLEST SERPL-MCNC: 139 MG/DL
CO2 SERPL-SCNC: 22 MMOL/L
CREAT SERPL-MCNC: 0.62 MG/DL
EGFRCR SERPLBLD CKD-EPI 2021: 125 ML/MIN/1.73M2
ESTIMATED AVERAGE GLUCOSE: 128 MG/DL
GLUCOSE SERPL-MCNC: 103 MG/DL
HBA1C MFR BLD HPLC: 6.1 %
HDLC SERPL-MCNC: 51 MG/DL
LDLC SERPL-MCNC: 70 MG/DL
NONHDLC SERPL-MCNC: 88 MG/DL
POTASSIUM SERPL-SCNC: 4.2 MMOL/L
PROT SERPL-MCNC: 8.1 G/DL
SODIUM SERPL-SCNC: 141 MMOL/L
TRIGL SERPL-MCNC: 99 MG/DL
TSH SERPL-ACNC: 0.96 UIU/ML

## 2025-09-17 LAB — APO LP(A) SERPL-MCNC: <9 NMOL/L
